# Patient Record
Sex: FEMALE | Race: WHITE | NOT HISPANIC OR LATINO | URBAN - METROPOLITAN AREA
[De-identification: names, ages, dates, MRNs, and addresses within clinical notes are randomized per-mention and may not be internally consistent; named-entity substitution may affect disease eponyms.]

---

## 2019-07-09 PROBLEM — Z00.00 ENCOUNTER FOR PREVENTIVE HEALTH EXAMINATION: Status: ACTIVE | Noted: 2019-07-09

## 2019-07-11 ENCOUNTER — OUTPATIENT (OUTPATIENT)
Dept: OUTPATIENT SERVICES | Facility: HOSPITAL | Age: 69
LOS: 1 days | End: 2019-07-11
Payer: MEDICARE

## 2019-07-11 ENCOUNTER — APPOINTMENT (OUTPATIENT)
Dept: MAMMOGRAPHY | Facility: IMAGING CENTER | Age: 69
End: 2019-07-11
Payer: MEDICARE

## 2019-07-11 DIAGNOSIS — Z00.8 ENCOUNTER FOR OTHER GENERAL EXAMINATION: ICD-10-CM

## 2019-07-11 PROCEDURE — 77063 BREAST TOMOSYNTHESIS BI: CPT

## 2019-07-11 PROCEDURE — 77067 SCR MAMMO BI INCL CAD: CPT | Mod: 26

## 2019-07-11 PROCEDURE — 77067 SCR MAMMO BI INCL CAD: CPT

## 2019-07-11 PROCEDURE — 77063 BREAST TOMOSYNTHESIS BI: CPT | Mod: 26

## 2019-07-18 ENCOUNTER — APPOINTMENT (OUTPATIENT)
Dept: ULTRASOUND IMAGING | Facility: IMAGING CENTER | Age: 69
End: 2019-07-18
Payer: MEDICARE

## 2019-07-18 ENCOUNTER — OUTPATIENT (OUTPATIENT)
Dept: OUTPATIENT SERVICES | Facility: HOSPITAL | Age: 69
LOS: 1 days | End: 2019-07-18
Payer: MEDICARE

## 2019-07-18 ENCOUNTER — APPOINTMENT (OUTPATIENT)
Dept: MAMMOGRAPHY | Facility: IMAGING CENTER | Age: 69
End: 2019-07-18
Payer: MEDICARE

## 2019-07-18 DIAGNOSIS — Z00.8 ENCOUNTER FOR OTHER GENERAL EXAMINATION: ICD-10-CM

## 2019-07-18 PROCEDURE — 77065 DX MAMMO INCL CAD UNI: CPT

## 2019-07-18 PROCEDURE — 77065 DX MAMMO INCL CAD UNI: CPT | Mod: 26,RT

## 2019-07-18 PROCEDURE — G0279: CPT

## 2019-07-18 PROCEDURE — 76642 ULTRASOUND BREAST LIMITED: CPT | Mod: 26,RT

## 2019-07-18 PROCEDURE — 76642 ULTRASOUND BREAST LIMITED: CPT

## 2019-07-18 PROCEDURE — G0279: CPT | Mod: 26

## 2020-01-31 ENCOUNTER — APPOINTMENT (OUTPATIENT)
Dept: ULTRASOUND IMAGING | Facility: IMAGING CENTER | Age: 70
End: 2020-01-31
Payer: MEDICARE

## 2020-01-31 ENCOUNTER — OUTPATIENT (OUTPATIENT)
Dept: OUTPATIENT SERVICES | Facility: HOSPITAL | Age: 70
LOS: 1 days | End: 2020-01-31
Payer: MEDICARE

## 2020-01-31 ENCOUNTER — APPOINTMENT (OUTPATIENT)
Dept: MAMMOGRAPHY | Facility: IMAGING CENTER | Age: 70
End: 2020-01-31
Payer: MEDICARE

## 2020-01-31 DIAGNOSIS — Z00.8 ENCOUNTER FOR OTHER GENERAL EXAMINATION: ICD-10-CM

## 2020-01-31 PROCEDURE — 76642 ULTRASOUND BREAST LIMITED: CPT | Mod: 26,RT

## 2020-01-31 PROCEDURE — 76642 ULTRASOUND BREAST LIMITED: CPT

## 2020-02-06 ENCOUNTER — RESULT REVIEW (OUTPATIENT)
Age: 70
End: 2020-02-06

## 2020-02-06 ENCOUNTER — OUTPATIENT (OUTPATIENT)
Dept: OUTPATIENT SERVICES | Facility: HOSPITAL | Age: 70
LOS: 1 days | End: 2020-02-06
Payer: MEDICARE

## 2020-02-06 ENCOUNTER — APPOINTMENT (OUTPATIENT)
Dept: ULTRASOUND IMAGING | Facility: IMAGING CENTER | Age: 70
End: 2020-02-06
Payer: MEDICARE

## 2020-02-06 DIAGNOSIS — Z00.8 ENCOUNTER FOR OTHER GENERAL EXAMINATION: ICD-10-CM

## 2020-02-06 PROCEDURE — 19083 BX BREAST 1ST LESION US IMAG: CPT | Mod: RT

## 2020-02-06 PROCEDURE — 77065 DX MAMMO INCL CAD UNI: CPT | Mod: 26,RT

## 2020-02-06 PROCEDURE — 19083 BX BREAST 1ST LESION US IMAG: CPT

## 2020-02-06 PROCEDURE — 77065 DX MAMMO INCL CAD UNI: CPT

## 2020-02-06 PROCEDURE — 88305 TISSUE EXAM BY PATHOLOGIST: CPT | Mod: 26

## 2020-02-06 PROCEDURE — 88305 TISSUE EXAM BY PATHOLOGIST: CPT

## 2020-02-06 PROCEDURE — A4648: CPT

## 2020-02-08 LAB — SURGICAL PATHOLOGY STUDY: SIGNIFICANT CHANGE UP

## 2021-05-21 ENCOUNTER — APPOINTMENT (OUTPATIENT)
Dept: UROGYNECOLOGY | Facility: CLINIC | Age: 71
End: 2021-05-21
Payer: MEDICARE

## 2021-05-21 VITALS
WEIGHT: 129 LBS | OXYGEN SATURATION: 98 % | TEMPERATURE: 96.9 F | HEIGHT: 60 IN | BODY MASS INDEX: 25.32 KG/M2 | SYSTOLIC BLOOD PRESSURE: 122 MMHG | DIASTOLIC BLOOD PRESSURE: 73 MMHG | HEART RATE: 72 BPM

## 2021-05-21 DIAGNOSIS — I10 ESSENTIAL (PRIMARY) HYPERTENSION: ICD-10-CM

## 2021-05-21 DIAGNOSIS — N32.89 OTHER SPECIFIED DISORDERS OF BLADDER: ICD-10-CM

## 2021-05-21 DIAGNOSIS — N81.3 COMPLETE UTEROVAGINAL PROLAPSE: ICD-10-CM

## 2021-05-21 DIAGNOSIS — E11.9 TYPE 2 DIABETES MELLITUS W/OUT COMPLICATIONS: ICD-10-CM

## 2021-05-21 DIAGNOSIS — R39.15 URGENCY OF URINATION: ICD-10-CM

## 2021-05-21 DIAGNOSIS — R39.9 UNSPECIFIED SYMPTOMS AND SIGNS INVOLVING THE GENITOURINARY SYSTEM: ICD-10-CM

## 2021-05-21 DIAGNOSIS — N39.46 MIXED INCONTINENCE: ICD-10-CM

## 2021-05-21 DIAGNOSIS — E78.5 HYPERLIPIDEMIA, UNSPECIFIED: ICD-10-CM

## 2021-05-21 LAB
BILIRUB UR QL STRIP: NORMAL
CLARITY UR: NORMAL
COLLECTION METHOD: NORMAL
GLUCOSE UR-MCNC: NORMAL
HCG UR QL: 0.2 EU/DL
HGB UR QL STRIP.AUTO: NORMAL
KETONES UR-MCNC: NORMAL
LEUKOCYTE ESTERASE UR QL STRIP: NORMAL
NITRITE UR QL STRIP: POSITIVE
PH UR STRIP: 5
PROT UR STRIP-MCNC: 100
SP GR UR STRIP: 1.02

## 2021-05-21 PROCEDURE — 51701 INSERT BLADDER CATHETER: CPT

## 2021-05-21 PROCEDURE — 99072 ADDL SUPL MATRL&STAF TM PHE: CPT

## 2021-05-21 PROCEDURE — 99204 OFFICE O/P NEW MOD 45 MIN: CPT | Mod: 25

## 2021-05-21 RX ORDER — SULFAMETHOXAZOLE AND TRIMETHOPRIM 800; 160 MG/1; MG/1
800-160 TABLET ORAL TWICE DAILY
Qty: 6 | Refills: 0 | Status: ACTIVE | COMMUNITY
Start: 2021-05-21 | End: 1900-01-01

## 2021-05-21 NOTE — OB HISTORY
[Total Preg ___] : : [unfilled] [Full Term ___] : [unfilled] (full-term) [Premature ___] : [unfilled] (premature) [ ___] : [unfilled]  section delivery(s) [Definite ___ (Date)] : the last menstrual period was [unfilled]

## 2021-05-23 NOTE — HISTORY OF PRESENT ILLNESS
[x3+] : nocturia three or more  times a night [Urinary Frequency] : no [Uses ___ pads per day] : uses [unfilled] pad(s) per day [Constipation Obstructed Defecation] : no [Stool Visible Blood] : no [Incomplete Emptying Of Stool] : no [] : no [Sexual Dysfunction, NOS] : no [FreeTextEntry5] : worse over the past few months [de-identified] : 2-3 [de-identified] : almost w/ all coughing/sneezing [de-identified] : 1 [de-identified] : most days [de-identified] : 1 [de-identified] : q2h [de-identified] : must splint to completely empty [de-identified] : q2h [de-identified] : regularly [de-identified] : 1 [de-identified] : intermittent bladder spasm at least daily [de-identified] : 4-5 [de-identified] : w/ prolonged [FreeTextEntry1] : PMH: DM, HTN, HLD \par PSH: c/s x4\par +sexually active\par \par Fluid intake: 1-2 cups/coffee, diet soda 1/2 liter/day, water 2 cups/day\par ROS as per questionnaire.\par

## 2021-05-23 NOTE — DISCUSSION/SUMMARY
[FreeTextEntry1] : I reviewed the above findings with the patient with visual illustrations.  Treatment options were discussed and included doing nothing, Kegel exercises and behavioral modification, a pessary, or surgical correction.  Surgically we discussed the abdominal v.s the vaginal routes.  Abdominally we discussed a hysterectomy and a sacral colpopexy laparoscopically and robotically.  Vaginally we discussed a vaginal hysterectomy, utereosacral suspension, and anterior/posterior repair.  Surgically we discussed hysteropexy as well as the use of biologics.  She is interested in surgical correction and I have asked her to return for urodynamic testing to further evaluate her urinary complaints.  I've also asked her to go for a pelvic ultrasound.  We will discuss further treatment options after her evaluation is completed.  Patient information on pelvic prolapse, stress incontinence, overactive bladder.  All questions were answered.\par \par \par ADDENDUM: Patient Udip reviewed. Discussed result with patient. Patient denies dysuria, fevers, chills, but discussed that patient bladder spasms can be a sx of UTI. Discussed with patient option of waiting for UCx result vs treatment now. Patient opted for Rx now. Discussed would follow up on UCx if she needs to switch abx. No allergies. Will send treatment with Bactrim x 3 days. \par

## 2021-05-23 NOTE — PHYSICAL EXAM
[Chaperone Present] : A chaperone was present in the examining room during all aspects of the physical examination [No Acute Distress] : in no acute distress [Well developed] : well developed [Well Nourished] : ~L well nourished [Oriented x3] : oriented to person, place, and time [Respirations regular] : ~T respiratory rate was regular [No Edema] : ~T edema was not present [Scar] : a scar was noted [Warm and Dry] : was warm and dry to touch [Normal Gait] : gait was normal [Mid-line] : in the mid-line [Post Void Residual ____ml] : post void residual was [unfilled] ml [Rectocele] : a rectocele [Cystocele] : a cystocele [Aa ____] : Aa [unfilled] [Ba ____] : Ba [unfilled] [C ____] : C [unfilled] [GH ____] : GH [unfilled] [PB ____] : PB [unfilled] [TVL ____] : TVL  [unfilled] [Ap ____] : Ap [unfilled] [Bp ____] : Bp [unfilled] [D ____] : D [unfilled] [] : III [Normal Appearance] : general appearance was normal [Atrophy] : atrophy [Uterine Prolapse] : uterine prolapse [Uterine Adnexae] : were not tender and not enlarged [Normal] : normal [Normal rectal exam] : was normal [Inguinal LAD] : no adenopathy was noted in the inguinal lymph nodes [FreeTextEntry3] : = hypermobility

## 2021-05-24 LAB
APPEARANCE: ABNORMAL
BACTERIA: ABNORMAL
BILIRUBIN URINE: NEGATIVE
BLOOD URINE: NORMAL
COLOR: YELLOW
GLUCOSE QUALITATIVE U: NEGATIVE
HYALINE CASTS: 0 /LPF
KETONES URINE: NEGATIVE
LEUKOCYTE ESTERASE URINE: ABNORMAL
MICROSCOPIC-UA: NORMAL
NITRITE URINE: POSITIVE
PH URINE: 6
PROTEIN URINE: ABNORMAL
RED BLOOD CELLS URINE: 8 /HPF
SPECIFIC GRAVITY URINE: 1.01
SQUAMOUS EPITHELIAL CELLS: 0 /HPF
UROBILINOGEN URINE: NORMAL
WHITE BLOOD CELLS URINE: >720 /HPF

## 2021-05-25 RX ORDER — FUROSEMIDE 20 MG/1
20 TABLET ORAL
Qty: 90 | Refills: 0 | Status: ACTIVE | COMMUNITY
Start: 2020-12-22

## 2021-05-25 RX ORDER — GLIPIZIDE 5 MG/1
5 TABLET, FILM COATED, EXTENDED RELEASE ORAL
Qty: 90 | Refills: 0 | Status: ACTIVE | COMMUNITY
Start: 2020-12-22

## 2021-05-25 RX ORDER — PANTOPRAZOLE 40 MG/1
40 TABLET, DELAYED RELEASE ORAL
Qty: 90 | Refills: 0 | Status: ACTIVE | COMMUNITY
Start: 2020-12-22

## 2021-05-25 RX ORDER — LOSARTAN POTASSIUM 50 MG/1
50 TABLET, FILM COATED ORAL
Qty: 90 | Refills: 0 | Status: ACTIVE | COMMUNITY
Start: 2020-12-22

## 2021-05-25 RX ORDER — GEMFIBROZIL 600 MG/1
600 TABLET, FILM COATED ORAL
Qty: 180 | Refills: 0 | Status: ACTIVE | COMMUNITY
Start: 2020-12-22

## 2021-05-25 RX ORDER — INDOMETHACIN 50 MG/1
50 CAPSULE ORAL
Qty: 60 | Refills: 0 | Status: ACTIVE | COMMUNITY
Start: 2020-12-22

## 2021-05-25 RX ORDER — METFORMIN HYDROCHLORIDE 1000 MG/1
1000 TABLET, COATED ORAL
Qty: 180 | Refills: 0 | Status: ACTIVE | COMMUNITY
Start: 2020-12-22

## 2021-05-25 RX ORDER — LEVOTHYROXINE SODIUM 50 UG/1
50 TABLET ORAL
Qty: 90 | Refills: 0 | Status: ACTIVE | COMMUNITY
Start: 2020-12-22

## 2021-05-28 ENCOUNTER — NON-APPOINTMENT (OUTPATIENT)
Age: 71
End: 2021-05-28

## 2021-05-28 ENCOUNTER — OUTPATIENT (OUTPATIENT)
Dept: OUTPATIENT SERVICES | Facility: HOSPITAL | Age: 71
LOS: 1 days | End: 2021-05-28
Payer: MEDICARE

## 2021-05-28 ENCOUNTER — APPOINTMENT (OUTPATIENT)
Dept: ULTRASOUND IMAGING | Facility: CLINIC | Age: 71
End: 2021-05-28
Payer: MEDICARE

## 2021-05-28 DIAGNOSIS — N81.3 COMPLETE UTEROVAGINAL PROLAPSE: ICD-10-CM

## 2021-05-28 DIAGNOSIS — Z00.8 ENCOUNTER FOR OTHER GENERAL EXAMINATION: ICD-10-CM

## 2021-05-28 PROCEDURE — 76856 US EXAM PELVIC COMPLETE: CPT | Mod: 26

## 2021-05-28 PROCEDURE — 76856 US EXAM PELVIC COMPLETE: CPT

## 2021-06-01 ENCOUNTER — APPOINTMENT (OUTPATIENT)
Dept: UROGYNECOLOGY | Facility: CLINIC | Age: 71
End: 2021-06-01
Payer: MEDICARE

## 2021-06-01 ENCOUNTER — OUTPATIENT (OUTPATIENT)
Dept: OUTPATIENT SERVICES | Facility: HOSPITAL | Age: 71
LOS: 1 days | End: 2021-06-01
Payer: MEDICARE

## 2021-06-01 ENCOUNTER — RESULT CHARGE (OUTPATIENT)
Age: 71
End: 2021-06-01

## 2021-06-01 VITALS — SYSTOLIC BLOOD PRESSURE: 115 MMHG | HEART RATE: 71 BPM | DIASTOLIC BLOOD PRESSURE: 60 MMHG | TEMPERATURE: 97.2 F

## 2021-06-01 DIAGNOSIS — Z01.818 ENCOUNTER FOR OTHER PREPROCEDURAL EXAMINATION: ICD-10-CM

## 2021-06-01 LAB
BILIRUB UR QL STRIP: NORMAL
CLARITY UR: CLEAR
COLLECTION METHOD: NORMAL
GLUCOSE UR-MCNC: NORMAL
HCG UR QL: 0.2 EU/DL
HGB UR QL STRIP.AUTO: NORMAL
KETONES UR-MCNC: NORMAL
LEUKOCYTE ESTERASE UR QL STRIP: NORMAL
NITRITE UR QL STRIP: NORMAL
PH UR STRIP: 5.5
PROT UR STRIP-MCNC: 30
SP GR UR STRIP: 1.02

## 2021-06-01 PROCEDURE — 51797 INTRAABDOMINAL PRESSURE TEST: CPT

## 2021-06-01 PROCEDURE — 51784 ANAL/URINARY MUSCLE STUDY: CPT

## 2021-06-01 PROCEDURE — 51729 CYSTOMETROGRAM W/VP&UP: CPT | Mod: 26

## 2021-06-01 PROCEDURE — 99072 ADDL SUPL MATRL&STAF TM PHE: CPT

## 2021-06-01 PROCEDURE — 51797 INTRAABDOMINAL PRESSURE TEST: CPT | Mod: 26

## 2021-06-01 PROCEDURE — 81003 URINALYSIS AUTO W/O SCOPE: CPT | Mod: QW

## 2021-06-01 PROCEDURE — 51729 CYSTOMETROGRAM W/VP&UP: CPT

## 2021-06-01 PROCEDURE — 51784 ANAL/URINARY MUSCLE STUDY: CPT | Mod: 26

## 2021-06-07 ENCOUNTER — APPOINTMENT (OUTPATIENT)
Dept: UROGYNECOLOGY | Facility: CLINIC | Age: 71
End: 2021-06-07
Payer: MEDICARE

## 2021-06-07 VITALS — TEMPERATURE: 97.5 F

## 2021-06-07 DIAGNOSIS — N36.41 HYPERMOBILITY OF URETHRA: ICD-10-CM

## 2021-06-07 PROCEDURE — 99214 OFFICE O/P EST MOD 30 MIN: CPT

## 2021-06-07 PROCEDURE — 99072 ADDL SUPL MATRL&STAF TM PHE: CPT

## 2021-06-07 NOTE — HISTORY OF PRESENT ILLNESS
[FreeTextEntry1] : Patient returns today with her  for followup on her pelvic organ prolapse and urinary incontinence. Her chart was reviewed. Review of symptoms was unchanged from initial visit. On initial visit. She had a pop Q stage 3 pelvic organ prolapse with uterovaginal prolapse, cystocele and rectocele.  Urodynamics revealed stress urinary incontinence.  Pelvic ultrasound was wnl. I reviewed the above findings with the patient with visual illustrations. Treatment options for the prolapse were discussed and included doing nothing, Kegel exercises and behavioral modification, a pessary, or surgical correction.Surgically we discussed the abdominal vs the vaginal routes. Abdominally we discussed a hysterectomy and a sacral colpopexy   laparoscopically and robotically.  Vaginally we discussed a vaginal hysterectomy, uterosacral suspension, and anterior/posterior repair. Surgically we discussed hysteropexy as well as the use of biologics. Patient is interested in long term management and durability and wishes to proceed via the laparoscopic robotic route with a hysterectomy and sacral colpopexy.We discussed treatment options for Stress urinary incontinence, and she wishes to proceed with a mid urethral sling at the time of surgical correction.Patient is aware there is added risk to procedure due to hx of 4 prior c/s and likely longer operating time An increased risk of injury to bladder, as well as bowel.  I have asked her to go for medical clearance. Patient IUGA information Sacral colpopexy with hysterectomy written on top and mid urethral sling was given to her. We discussed the possibility of going home with a catheter.    All questions were answered.

## 2021-06-09 ENCOUNTER — APPOINTMENT (OUTPATIENT)
Dept: UROGYNECOLOGY | Facility: CLINIC | Age: 71
End: 2021-06-09
Payer: MEDICARE

## 2021-06-09 VITALS
HEART RATE: 70 BPM | SYSTOLIC BLOOD PRESSURE: 145 MMHG | TEMPERATURE: 97.1 F | DIASTOLIC BLOOD PRESSURE: 75 MMHG | OXYGEN SATURATION: 95 %

## 2021-06-09 PROCEDURE — 99214 OFFICE O/P EST MOD 30 MIN: CPT

## 2021-06-09 PROCEDURE — 99072 ADDL SUPL MATRL&STAF TM PHE: CPT

## 2021-06-09 NOTE — HISTORY OF PRESENT ILLNESS
[FreeTextEntry1] : Patient returns today for followup on her pelvic organ prolapse and urinary incontinence. Her chart was reviewed. Review of symptoms was unchanged from initial visit. On initial visit she had a popQ stage 3 pelvic organ prolapse with uterovaginal prolapse, cystocele and rectocele.  Urodynamics revealed stress urinary incontinence.  Pelvic ultrasound was wnl.Patient is aware there is added risk to procedure due to hx of 4 prior c/s and likely longer operating time An increased risk of injury to bladder, as well as bowel.  I have asked her to go for medical clearance. Patient IUGA information Sacral colpopexy with hysterectomy written on top and mid urethral sling was given to her. We discussed the possibility of going home with a catheter.    All questions were answered.

## 2021-06-09 NOTE — DISCUSSION/SUMMARY
[FreeTextEntry1] : I reviewed the above findings with the patient as well surgical and nonsurgical treatment options for the prolapse  and stress incontinence and she wishes to proceed with surgical correction via laparoscopic/robotic hysterectomy and sacral colpopexy for the prolapse and mid urethral sling for the stress incontinence. Risks and benefits of a supracervical hysterectomy versus total were discussed and she wishes to proceed with a supracervical hysterectomy. Risks and benefits of a BSO were discussed and she wishes to proceed with a bilateral salpingectomy if possible. We discussed the possibility of a laparotomy at the time of surgical correction. Patient is aware there is added risk to procedure due to hx of 4 prior c/s and likely longer operating time An increased risk of injury to bladder, as well as bowel. We discussed the placement of a suburethral sling. Risks and benefits of a TOT sling versus a Retropubic sling  vs mini-sling were discussed and included but not limited to bladder and bowel perforation, voiding dysfunction, and groin pain. She wishes to proceed with a retropubic sling.Risks and benefits of the procedure were discussed and included but not limited to bleeding, infection, failure, erosion, dyspareunia, damage to other organs,  including but not limited to bladder, bowel, and ureters, developing chronic pelvic pain, and urinary retention. We discussed the possibility of going home with a catheter. Hospital stay, postoperative restrictions, and anesthesia were discussed. All questions were answered and she wishes to proceed with surgical correction. He discussed possible oophorectomy, and anterior posterior repair. We discussed pelvic examination under anesthesia by learners. Consent was signed in the office. \par

## 2021-06-09 NOTE — PHYSICAL EXAM
[Chaperone Present] : A chaperone was present in the examining room during all aspects of the physical examination [No Acute Distress] : in no acute distress [Well developed] : well developed [Well Nourished] : ~L well nourished [Oriented x3] : oriented to person, place, and time [Scar] : a scar was noted [Mid-line] : in the mid-line [Soft, Nontender] : the abdomen was soft and nontender [Warm and Dry] : was warm and dry to touch [Vulvar Atrophy] : vulvar atrophy [Normal Appearance] : general appearance was normal [Rectocele] : a rectocele [Cystocele] : a cystocele [Uterine Prolapse] : uterine prolapse [Pap Obtained] : a Pap smear was performed [Normal] : normal [Uterine Adnexae] : were not tender and not enlarged [de-identified] : No change from initial exam

## 2021-06-11 ENCOUNTER — OUTPATIENT (OUTPATIENT)
Dept: OUTPATIENT SERVICES | Facility: HOSPITAL | Age: 71
LOS: 1 days | End: 2021-06-11
Payer: MEDICARE

## 2021-06-11 VITALS
HEART RATE: 82 BPM | OXYGEN SATURATION: 97 % | RESPIRATION RATE: 16 BRPM | SYSTOLIC BLOOD PRESSURE: 117 MMHG | TEMPERATURE: 99 F | HEIGHT: 60 IN | DIASTOLIC BLOOD PRESSURE: 66 MMHG | WEIGHT: 132.06 LBS

## 2021-06-11 DIAGNOSIS — N81.11 CYSTOCELE, MIDLINE: ICD-10-CM

## 2021-06-11 DIAGNOSIS — N81.3 COMPLETE UTEROVAGINAL PROLAPSE: ICD-10-CM

## 2021-06-11 DIAGNOSIS — E11.9 TYPE 2 DIABETES MELLITUS WITHOUT COMPLICATIONS: ICD-10-CM

## 2021-06-11 DIAGNOSIS — Z29.9 ENCOUNTER FOR PROPHYLACTIC MEASURES, UNSPECIFIED: ICD-10-CM

## 2021-06-11 DIAGNOSIS — N39.3 STRESS INCONTINENCE (FEMALE) (MALE): ICD-10-CM

## 2021-06-11 DIAGNOSIS — N81.6 RECTOCELE: ICD-10-CM

## 2021-06-11 DIAGNOSIS — I10 ESSENTIAL (PRIMARY) HYPERTENSION: ICD-10-CM

## 2021-06-11 DIAGNOSIS — Z98.891 HISTORY OF UTERINE SCAR FROM PREVIOUS SURGERY: Chronic | ICD-10-CM

## 2021-06-11 DIAGNOSIS — Z01.818 ENCOUNTER FOR OTHER PREPROCEDURAL EXAMINATION: ICD-10-CM

## 2021-06-11 LAB
ANION GAP SERPL CALC-SCNC: 14 MMOL/L — SIGNIFICANT CHANGE UP (ref 5–17)
BACTERIA UR CULT: NORMAL
BLD GP AB SCN SERPL QL: POSITIVE — SIGNIFICANT CHANGE UP
BUN SERPL-MCNC: 35 MG/DL — HIGH (ref 7–23)
CALCIUM SERPL-MCNC: 10.7 MG/DL — HIGH (ref 8.4–10.5)
CHLORIDE SERPL-SCNC: 105 MMOL/L — SIGNIFICANT CHANGE UP (ref 96–108)
CO2 SERPL-SCNC: 19 MMOL/L — LOW (ref 22–31)
CREAT SERPL-MCNC: 1.65 MG/DL — HIGH (ref 0.5–1.3)
GLUCOSE SERPL-MCNC: 77 MG/DL — SIGNIFICANT CHANGE UP (ref 70–99)
HCT VFR BLD CALC: 31.3 % — LOW (ref 34.5–45)
HGB BLD-MCNC: 10.3 G/DL — LOW (ref 11.5–15.5)
HPV HIGH+LOW RISK DNA PNL CVX: NOT DETECTED
MCHC RBC-ENTMCNC: 32.9 GM/DL — SIGNIFICANT CHANGE UP (ref 32–36)
MCHC RBC-ENTMCNC: 33 PG — SIGNIFICANT CHANGE UP (ref 27–34)
MCV RBC AUTO: 100.3 FL — HIGH (ref 80–100)
NRBC # BLD: 0 /100 WBCS — SIGNIFICANT CHANGE UP (ref 0–0)
PLATELET # BLD AUTO: 389 K/UL — SIGNIFICANT CHANGE UP (ref 150–400)
POTASSIUM SERPL-MCNC: 4.7 MMOL/L — SIGNIFICANT CHANGE UP (ref 3.5–5.3)
POTASSIUM SERPL-SCNC: 4.7 MMOL/L — SIGNIFICANT CHANGE UP (ref 3.5–5.3)
RBC # BLD: 3.12 M/UL — LOW (ref 3.8–5.2)
RBC # FLD: 13.7 % — SIGNIFICANT CHANGE UP (ref 10.3–14.5)
RH IG SCN BLD-IMP: POSITIVE — SIGNIFICANT CHANGE UP
SODIUM SERPL-SCNC: 138 MMOL/L — SIGNIFICANT CHANGE UP (ref 135–145)
WBC # BLD: 7.95 K/UL — SIGNIFICANT CHANGE UP (ref 3.8–10.5)
WBC # FLD AUTO: 7.95 K/UL — SIGNIFICANT CHANGE UP (ref 3.8–10.5)

## 2021-06-11 PROCEDURE — 86870 RBC ANTIBODY IDENTIFICATION: CPT

## 2021-06-11 PROCEDURE — 86905 BLOOD TYPING RBC ANTIGENS: CPT

## 2021-06-11 PROCEDURE — 86901 BLOOD TYPING SEROLOGIC RH(D): CPT

## 2021-06-11 PROCEDURE — G0463: CPT

## 2021-06-11 PROCEDURE — 85027 COMPLETE CBC AUTOMATED: CPT

## 2021-06-11 PROCEDURE — 86850 RBC ANTIBODY SCREEN: CPT

## 2021-06-11 PROCEDURE — 86880 COOMBS TEST DIRECT: CPT

## 2021-06-11 PROCEDURE — 86900 BLOOD TYPING SEROLOGIC ABO: CPT

## 2021-06-11 PROCEDURE — 80048 BASIC METABOLIC PNL TOTAL CA: CPT

## 2021-06-11 RX ORDER — CEFOTETAN DISODIUM 1 G
2 VIAL (EA) INJECTION ONCE
Refills: 0 | Status: DISCONTINUED | OUTPATIENT
Start: 2021-06-24 | End: 2021-06-25

## 2021-06-11 RX ORDER — CELECOXIB 200 MG/1
400 CAPSULE ORAL ONCE
Refills: 0 | Status: DISCONTINUED | OUTPATIENT
Start: 2021-06-24 | End: 2021-06-25

## 2021-06-11 NOTE — H&P PST ADULT - PULMONARY EMBOLUS
Report received at bedside in ED by RN, patient care assumed. Placed on ZOLL monitor and transferred to unit. Tele box on. Monitor room informed. POC updated on white board Patient states pain is 3/10. VS taken. Fall precautions in place with bed in lowest position, treaded sock slippers on, and call light within reach. Will continue assess and monitor cognitive function and any physiological changes.    no

## 2021-06-11 NOTE — H&P PST ADULT - NSANTHOSAYNRD_GEN_A_CORE
No. JEVON screening performed.  STOP BANG Legend: 0-2 = LOW Risk; 3-4 = INTERMEDIATE Risk; 5-8 = HIGH Risk

## 2021-06-11 NOTE — H&P PST ADULT - NSICDXPROBLEM_GEN_ALL_CORE_FT
PROBLEM DIAGNOSES  Problem: Cystocele and rectocele with complete uterovaginal prolapse  Assessment and Plan: Laparoscopic rootic supra cervical hysterectomy  laparoscopic robotic sacral colpopexy  Mid urethral sling, cystoscopy  Labs- CBC, BMP, Hb A1C, T&S  Pre op instructions discussed  PMD eval prior to OR    Problem: Benign hypertension  Assessment and Plan: continue with meds    Problem: Type 2 diabetes mellitus  Assessment and Plan: FS BS DOS    Problem: Need for prophylactic measure  Assessment and Plan: The Caprini score indicates this patient is at risk for a VTE event (score 3-5).  Most surgical patients in this group would benefit from pharmacologic prophylaxis.  The surgical team will determine the balance between VTE risk and bleeding risk

## 2021-06-11 NOTE — H&P PST ADULT - HISTORY OF PRESENT ILLNESS
71 yo F with h/o HTN, T2DM, hypothyroidism, HLD c/o uterine prolapse x 2 years . Pt c/o increasing urinary frequency, stress incontinence x  3 months. Pt had uro/Gyn evaluation-s/p pelvic sonogram/urodynamics-revealed complete uterovaginal prolapse-scheduled for Laparoscopic robotic supracervical hysterectomy, sacral colpopexy, midurethral sling, cystoscopy on 6/24/21.    Pt denies any recent travel, fever, chills or Covid related infections  **Received covid vaccine x 2 doses, card enclosed

## 2021-06-11 NOTE — H&P PST ADULT - NSICDXPASTMEDICALHX_GEN_ALL_CORE_FT
PAST MEDICAL HISTORY:  COVID-19 virus infection 3/2020 denies any hospital admission    HLD (hyperlipidemia)     HTN (hypertension)     Hypothyroidism     T2DM (type 2 diabetes mellitus)

## 2021-06-11 NOTE — H&P PST ADULT - ASSESSMENT
CAPRINI SCORE [CLOT updated 18]    AGE RELATED RISK FACTORS                                                       MOBILITY RELATED FACTORS  [ ] Age 41-60 years                                            (1 Point)                    [ ] Bed rest                                                        (1 Point)  [x ] Age: 61-74 years                                           (2 Points)                  [ ] Plaster cast                                                   (2 Points)  [ ] Age= 75 years                                              (3 Points)                    [ ] Bed bound for more than 72 hours                 (2 Points)    DISEASE RELATED RISK FACTORS                                               GENDER SPECIFIC FACTORS  [ ] Edema in the lower extremities                       (1 Point)              [ ] Pregnancy                                                     (1 Point)  [ ] Varicose veins                                               (1 Point)                     [ ] Post-partum < 6 weeks                                   (1 Point)             [x ] BMI > 25 Kg/m2                                            (1 Point)                     [ ] Hormonal therapy  or oral contraception          (1 Point)                 [ ] Sepsis (in the previous month)                        (1 Point)               [ ] History of pregnancy complications                 (1 point)  [ ] Pneumonia or serious lung disease                                               [ ] Unexplained or recurrent                     (1 Point)           (in the previous month)                               (1 Point)  [ ] Abnormal pulmonary function test                     (1 Point)                 SURGERY RELATED RISK FACTORS  [ ] Acute myocardial infarction                              (1 Point)               [ ]  Section                                             (1 Point)  [ ] Congestive heart failure (in the previous month)  (1 Point)      [ ] Minor surgery                                                  (1 Point)   [ ] Inflammatory bowel disease                             (1 Point)               [ ] Arthroscopic surgery                                        (2 Points)  [ ] Central venous access                                      (2 Points)                [x ] General surgery lasting more than 45 minutes (2 points)  [ ] Present or previous malignancy                     (2 Points)                [ ] Elective arthroplasty                                         (5 points)    [ ] Stroke (in the previous month)                          (5 Points)                                                                                                                                                           HEMATOLOGY RELATED FACTORS                                                 TRAUMA RELATED RISK FACTORS  [ ] Prior episodes of VTE                                     (3 Points)                [ ] Fracture of the hip, pelvis, or leg                       (5 Points)  [ ] Positive family history for VTE                         (3 Points)             [ ] Acute spinal cord injury (in the previous month)  (5 Points)  [ ] Prothrombin 71326 A                                     (3 Points)               [ ] Paralysis  (less than 1 month)                             (5 Points)  [ ] Factor V Leiden                                             (3 Points)                  [ ] Multiple Trauma within 1 month                        (5 Points)  [ ] Lupus anticoagulants                                     (3 Points)                                                           [ ] Anticardiolipin antibodies                               (3 Points)                                                       [ ] High homocysteine in the blood                      (3 Points)                                             [ ] Other congenital or acquired thrombophilia      (3 Points)                                                [ ] Heparin induced thrombocytopenia                  (3 Points)                                     Total Score [  4        ]

## 2021-06-12 LAB
A1C WITH ESTIMATED AVERAGE GLUCOSE RESULT: 5.5 % — SIGNIFICANT CHANGE UP (ref 4–5.6)
ESTIMATED AVERAGE GLUCOSE: 111 MG/DL — SIGNIFICANT CHANGE UP (ref 68–114)

## 2021-06-12 PROCEDURE — 86077 PHYS BLOOD BANK SERV XMATCH: CPT

## 2021-06-16 LAB — CYTOLOGY CVX/VAG DOC THIN PREP: ABNORMAL

## 2021-06-17 PROBLEM — E78.5 HYPERLIPIDEMIA, UNSPECIFIED: Chronic | Status: ACTIVE | Noted: 2021-06-11

## 2021-06-17 PROBLEM — E11.9 TYPE 2 DIABETES MELLITUS WITHOUT COMPLICATIONS: Chronic | Status: ACTIVE | Noted: 2021-06-11

## 2021-06-17 PROBLEM — U07.1 COVID-19: Chronic | Status: ACTIVE | Noted: 2021-06-11

## 2021-06-17 PROBLEM — I10 ESSENTIAL (PRIMARY) HYPERTENSION: Chronic | Status: ACTIVE | Noted: 2021-06-11

## 2021-06-17 PROBLEM — E03.9 HYPOTHYROIDISM, UNSPECIFIED: Chronic | Status: ACTIVE | Noted: 2021-06-11

## 2021-06-23 ENCOUNTER — NON-APPOINTMENT (OUTPATIENT)
Age: 71
End: 2021-06-23

## 2021-06-23 ENCOUNTER — TRANSCRIPTION ENCOUNTER (OUTPATIENT)
Age: 71
End: 2021-06-23

## 2021-06-24 ENCOUNTER — INPATIENT (INPATIENT)
Facility: HOSPITAL | Age: 71
LOS: 0 days | Discharge: HOME | DRG: 743 | End: 2021-06-25
Attending: UROLOGY | Admitting: UROLOGY
Payer: MEDICARE

## 2021-06-24 ENCOUNTER — RESULT REVIEW (OUTPATIENT)
Age: 71
End: 2021-06-24

## 2021-06-24 ENCOUNTER — APPOINTMENT (OUTPATIENT)
Dept: UROGYNECOLOGY | Facility: HOSPITAL | Age: 71
End: 2021-06-24
Payer: MEDICARE

## 2021-06-24 VITALS
HEIGHT: 60 IN | TEMPERATURE: 98 F | HEART RATE: 64 BPM | SYSTOLIC BLOOD PRESSURE: 112 MMHG | OXYGEN SATURATION: 99 % | WEIGHT: 132.06 LBS | DIASTOLIC BLOOD PRESSURE: 72 MMHG | RESPIRATION RATE: 16 BRPM

## 2021-06-24 DIAGNOSIS — N81.11 CYSTOCELE, MIDLINE: ICD-10-CM

## 2021-06-24 DIAGNOSIS — N39.3 STRESS INCONTINENCE (FEMALE) (MALE): ICD-10-CM

## 2021-06-24 DIAGNOSIS — Z98.891 HISTORY OF UTERINE SCAR FROM PREVIOUS SURGERY: Chronic | ICD-10-CM

## 2021-06-24 LAB
ANION GAP SERPL CALC-SCNC: 15 MMOL/L — SIGNIFICANT CHANGE UP (ref 5–17)
BLD GP AB SCN SERPL QL: POSITIVE — SIGNIFICANT CHANGE UP
BUN SERPL-MCNC: 37 MG/DL — HIGH (ref 7–23)
CALCIUM SERPL-MCNC: 10.4 MG/DL — SIGNIFICANT CHANGE UP (ref 8.4–10.5)
CHLORIDE SERPL-SCNC: 106 MMOL/L — SIGNIFICANT CHANGE UP (ref 96–108)
CO2 SERPL-SCNC: 19 MMOL/L — LOW (ref 22–31)
CREAT SERPL-MCNC: 1.46 MG/DL — HIGH (ref 0.5–1.3)
GLUCOSE BLDC GLUCOMTR-MCNC: 209 MG/DL — HIGH (ref 70–99)
GLUCOSE BLDC GLUCOMTR-MCNC: 234 MG/DL — HIGH (ref 70–99)
GLUCOSE BLDC GLUCOMTR-MCNC: 92 MG/DL — SIGNIFICANT CHANGE UP (ref 70–99)
GLUCOSE SERPL-MCNC: 82 MG/DL — SIGNIFICANT CHANGE UP (ref 70–99)
HCT VFR BLD CALC: 35.3 % — SIGNIFICANT CHANGE UP (ref 34.5–45)
HGB BLD-MCNC: 11.6 G/DL — SIGNIFICANT CHANGE UP (ref 11.5–15.5)
MCHC RBC-ENTMCNC: 32.7 PG — SIGNIFICANT CHANGE UP (ref 27–34)
MCHC RBC-ENTMCNC: 32.9 GM/DL — SIGNIFICANT CHANGE UP (ref 32–36)
MCV RBC AUTO: 99.4 FL — SIGNIFICANT CHANGE UP (ref 80–100)
NRBC # BLD: 0 /100 WBCS — SIGNIFICANT CHANGE UP (ref 0–0)
PLATELET # BLD AUTO: 397 K/UL — SIGNIFICANT CHANGE UP (ref 150–400)
POTASSIUM SERPL-MCNC: 4.7 MMOL/L — SIGNIFICANT CHANGE UP (ref 3.5–5.3)
POTASSIUM SERPL-SCNC: 4.7 MMOL/L — SIGNIFICANT CHANGE UP (ref 3.5–5.3)
RBC # BLD: 3.55 M/UL — LOW (ref 3.8–5.2)
RBC # FLD: 13.9 % — SIGNIFICANT CHANGE UP (ref 10.3–14.5)
RH IG SCN BLD-IMP: POSITIVE — SIGNIFICANT CHANGE UP
RH IG SCN BLD-IMP: POSITIVE — SIGNIFICANT CHANGE UP
SODIUM SERPL-SCNC: 140 MMOL/L — SIGNIFICANT CHANGE UP (ref 135–145)
WBC # BLD: 8.82 K/UL — SIGNIFICANT CHANGE UP (ref 3.8–10.5)
WBC # FLD AUTO: 8.82 K/UL — SIGNIFICANT CHANGE UP (ref 3.8–10.5)

## 2021-06-24 PROCEDURE — 88307 TISSUE EXAM BY PATHOLOGIST: CPT | Mod: 26

## 2021-06-24 PROCEDURE — 58542 LSH W/T/O UT 250 G OR LESS: CPT

## 2021-06-24 PROCEDURE — 57250 REPAIR RECTUM & VAGINA: CPT

## 2021-06-24 PROCEDURE — 57288 REPAIR BLADDER DEFECT: CPT

## 2021-06-24 PROCEDURE — 57425 LAPAROSCOPY SURG COLPOPEXY: CPT

## 2021-06-24 RX ORDER — CHLORHEXIDINE GLUCONATE 213 G/1000ML
1 SOLUTION TOPICAL ONCE
Refills: 0 | Status: DISCONTINUED | OUTPATIENT
Start: 2021-06-24 | End: 2021-06-24

## 2021-06-24 RX ORDER — METOPROLOL TARTRATE 50 MG
5 TABLET ORAL ONCE
Refills: 0 | Status: DISCONTINUED | OUTPATIENT
Start: 2021-06-24 | End: 2021-06-25

## 2021-06-24 RX ORDER — DEXTROSE 50 % IN WATER 50 %
12.5 SYRINGE (ML) INTRAVENOUS ONCE
Refills: 0 | Status: DISCONTINUED | OUTPATIENT
Start: 2021-06-24 | End: 2021-06-25

## 2021-06-24 RX ORDER — HYDROMORPHONE HYDROCHLORIDE 2 MG/ML
0.25 INJECTION INTRAMUSCULAR; INTRAVENOUS; SUBCUTANEOUS
Refills: 0 | Status: DISCONTINUED | OUTPATIENT
Start: 2021-06-24 | End: 2021-06-24

## 2021-06-24 RX ORDER — GLUCAGON INJECTION, SOLUTION 0.5 MG/.1ML
1 INJECTION, SOLUTION SUBCUTANEOUS ONCE
Refills: 0 | Status: DISCONTINUED | OUTPATIENT
Start: 2021-06-24 | End: 2021-06-25

## 2021-06-24 RX ORDER — ONDANSETRON 8 MG/1
4 TABLET, FILM COATED ORAL ONCE
Refills: 0 | Status: DISCONTINUED | OUTPATIENT
Start: 2021-06-24 | End: 2021-06-24

## 2021-06-24 RX ORDER — DEXTROSE 50 % IN WATER 50 %
25 SYRINGE (ML) INTRAVENOUS ONCE
Refills: 0 | Status: DISCONTINUED | OUTPATIENT
Start: 2021-06-24 | End: 2021-06-25

## 2021-06-24 RX ORDER — SODIUM CHLORIDE 9 MG/ML
1000 INJECTION, SOLUTION INTRAVENOUS
Refills: 0 | Status: DISCONTINUED | OUTPATIENT
Start: 2021-06-24 | End: 2021-06-25

## 2021-06-24 RX ORDER — LEVOTHYROXINE SODIUM 125 MCG
50 TABLET ORAL DAILY
Refills: 0 | Status: DISCONTINUED | OUTPATIENT
Start: 2021-06-24 | End: 2021-06-25

## 2021-06-24 RX ORDER — ACETAMINOPHEN 500 MG
975 TABLET ORAL EVERY 6 HOURS
Refills: 0 | Status: DISCONTINUED | OUTPATIENT
Start: 2021-06-24 | End: 2021-06-25

## 2021-06-24 RX ORDER — INSULIN LISPRO 100/ML
VIAL (ML) SUBCUTANEOUS AT BEDTIME
Refills: 0 | Status: DISCONTINUED | OUTPATIENT
Start: 2021-06-24 | End: 2021-06-25

## 2021-06-24 RX ORDER — IBUPROFEN 200 MG
600 TABLET ORAL EVERY 6 HOURS
Refills: 0 | Status: DISCONTINUED | OUTPATIENT
Start: 2021-06-24 | End: 2021-06-25

## 2021-06-24 RX ORDER — GABAPENTIN 400 MG/1
600 CAPSULE ORAL ONCE
Refills: 0 | Status: COMPLETED | OUTPATIENT
Start: 2021-06-24 | End: 2021-06-24

## 2021-06-24 RX ORDER — LIDOCAINE HCL 20 MG/ML
0.2 VIAL (ML) INJECTION ONCE
Refills: 0 | Status: DISCONTINUED | OUTPATIENT
Start: 2021-06-24 | End: 2021-06-24

## 2021-06-24 RX ORDER — DEXTROSE 50 % IN WATER 50 %
15 SYRINGE (ML) INTRAVENOUS ONCE
Refills: 0 | Status: DISCONTINUED | OUTPATIENT
Start: 2021-06-24 | End: 2021-06-25

## 2021-06-24 RX ORDER — ACETAMINOPHEN 500 MG
1000 TABLET ORAL ONCE
Refills: 0 | Status: COMPLETED | OUTPATIENT
Start: 2021-06-24 | End: 2021-06-24

## 2021-06-24 RX ORDER — INSULIN LISPRO 100/ML
VIAL (ML) SUBCUTANEOUS
Refills: 0 | Status: DISCONTINUED | OUTPATIENT
Start: 2021-06-24 | End: 2021-06-25

## 2021-06-24 RX ORDER — OXYCODONE HYDROCHLORIDE 5 MG/1
5 TABLET ORAL EVERY 4 HOURS
Refills: 0 | Status: DISCONTINUED | OUTPATIENT
Start: 2021-06-24 | End: 2021-06-25

## 2021-06-24 RX ORDER — SODIUM CHLORIDE 9 MG/ML
3 INJECTION INTRAMUSCULAR; INTRAVENOUS; SUBCUTANEOUS EVERY 8 HOURS
Refills: 0 | Status: DISCONTINUED | OUTPATIENT
Start: 2021-06-24 | End: 2021-06-24

## 2021-06-24 RX ADMIN — GABAPENTIN 600 MILLIGRAM(S): 400 CAPSULE ORAL at 11:01

## 2021-06-24 RX ADMIN — Medication 1000 MILLIGRAM(S): at 11:01

## 2021-06-24 RX ADMIN — SODIUM CHLORIDE 75 MILLILITER(S): 9 INJECTION, SOLUTION INTRAVENOUS at 20:40

## 2021-06-24 NOTE — BRIEF OPERATIVE NOTE - OPERATION/FINDINGS
Uterus with dense adhesions of lower uterine segment to bladder.  Fallopian tubes with adhesions to bilateral ovaries.  Right ovary grossly normal.  Left ovary slightly enlarged and cystic.  Adhesions of bowel to left adnexa and side wall.  Following suspension and sling cystoscopy performed with bladder intact no suture no mesh.  Jet from right UO sluggish, flourescein given with strong jet noted bilaterally after.

## 2021-06-24 NOTE — BRIEF OPERATIVE NOTE - NSICDXBRIEFPREOP_GEN_ALL_CORE_FT
PRE-OP DIAGNOSIS:  Cystocele and rectocele with incomplete uterovaginal prolapse 24-Jun-2021 17:35:41  Elizabeth Bo  Urinary, incontinence, stress female 24-Jun-2021 17:35:53  Elizabeth Bo

## 2021-06-24 NOTE — BRIEF OPERATIVE NOTE - NSICDXBRIEFPROCEDURE_GEN_ALL_CORE_FT
PROCEDURES:  Robot-assisted laparoscopic supracervical hysterectomy with sacrocolpopexy 24-Jun-2021 17:34:41  Elizabeth Bo  Bilateral salpingectomy 24-Jun-2021 17:34:54  Elizabeth Bo  Left oophorectomy 24-Jun-2021 17:35:02  Elizabeth Bo  Creation, midurethral sling, female 24-Jun-2021 17:35:21  Elizabeth Bo   PROCEDURES:  Robot-assisted laparoscopic supracervical hysterectomy with sacrocolpopexy 24-Jun-2021 17:34:41  Elizabeth Bo  Bilateral salpingectomy 24-Jun-2021 17:34:54  Elizabeth Bo  Left oophorectomy 24-Jun-2021 17:35:02  Elizabeth Bo  Creation, midurethral sling, female 24-Jun-2021 17:35:21  Elizabeth Bo  Posterior colporrhaphy (rectocele repair) 24-Jun-2021 18:02:32  Elizabeth Bo

## 2021-06-24 NOTE — CHART NOTE - NSCHARTNOTEFT_GEN_A_CORE
R2 OBGYN POST-OP CHECK    S: Patient seen and evaluated at bedside. Pt sleeping, easily arousable. Patient reports pain controlled with analgesia. Pt denies N/V, SOB, CP, palpitations, fever/chills. Tolerating clears. Not OOB yet. Liang in place, UOP adequate.     O:   T(C): 36.4 (06-24-21 @ 20:00), Max: 36.4 (06-24-21 @ 20:00)  HR: 94 (06-24-21 @ 20:30) (86 - 95)  BP: 133/61 (06-24-21 @ 20:30) (133/61 - 161/67)  RR: 16 (06-24-21 @ 20:30) (16 - 18)  SpO2: 95% (06-24-21 @ 20:30) (95% - 100%)  Wt(kg): --  I&O's Summary    24 Jun 2021 07:01  -  24 Jun 2021 21:05  --------------------------------------------------------  IN: 150 mL / OUT: 780 mL / NET: -630 mL        Gen: Resting comfortably in bed, NAD  CV: S1S2, RRR  Lungs: CTA B/L  Abd: soft, appropriately tender, occasional BS x 4 quadrants.    Inc: Clean/dry/intact port sites x5 with dermabond  Ext: SCD's in place and functional, non-tender b/l, no edema        A/P: 70y Female s/p RA LINDSAY, SCC< MUS, posterior repair, cysto. Patient is recovering well post-op. Urine output adequate, 225 for last hour.     Neuro: PO Analgesia PRN  CV: Hemodynamically stable.  Monitor VS. CBC in AM.   Pulm: Saturating well on room air.  Encourage OOB and incentive spirometer use.   GI: Advance to regular diet as tolerated. Anti-emetics PRN.  : Liang to gravity. TOV in AM. UOP adequate  FEN: Electrolytes: LR@125cc/hr. BMP in AM.   Heme: DVT ppx w/ SCD's while in bed. Early ambulation, initially with assistance then as tolerated.  Continue HSQ   ID: Afebrile  Endo: ISS  Dispo: Discharge from PACU when criteria met.     Sherron, PGY2   d/w Dr. Bo

## 2021-06-24 NOTE — PRE-ANESTHESIA EVALUATION ADULT - NSANTHADDINFOFT_GEN_ALL_CORE
Medical clearance on chart. Patient noted to have Creatinine increased to 1.6 in PST. GYN staff contacted PMD for baseline reference which was 1.2. PMD agreed with repeating Creatinine DOS and proceeding if repeat < 1.5. Repeat =1.46 so will proceed with procedure. PMD to follow up with patient post op. Dr. Friedman agrees with plan.

## 2021-06-24 NOTE — BRIEF OPERATIVE NOTE - NSICDXBRIEFPOSTOP_GEN_ALL_CORE_FT
POST-OP DIAGNOSIS:  Urinary, incontinence, stress female 24-Jun-2021 17:36:18  Elizabeth Bo  Cystocele and rectocele with incomplete uterovaginal prolapse 24-Jun-2021 17:36:09  Elizabeth Bo

## 2021-06-25 ENCOUNTER — TRANSCRIPTION ENCOUNTER (OUTPATIENT)
Age: 71
End: 2021-06-25

## 2021-06-25 VITALS
DIASTOLIC BLOOD PRESSURE: 42 MMHG | OXYGEN SATURATION: 97 % | SYSTOLIC BLOOD PRESSURE: 104 MMHG | RESPIRATION RATE: 16 BRPM | HEART RATE: 87 BPM | TEMPERATURE: 98 F

## 2021-06-25 LAB
ANION GAP SERPL CALC-SCNC: 17 MMOL/L — SIGNIFICANT CHANGE UP (ref 5–17)
BUN SERPL-MCNC: 36 MG/DL — HIGH (ref 7–23)
CALCIUM SERPL-MCNC: 8.7 MG/DL — SIGNIFICANT CHANGE UP (ref 8.4–10.5)
CHLORIDE SERPL-SCNC: 102 MMOL/L — SIGNIFICANT CHANGE UP (ref 96–108)
CO2 SERPL-SCNC: 19 MMOL/L — LOW (ref 22–31)
CREAT SERPL-MCNC: 1.45 MG/DL — HIGH (ref 0.5–1.3)
GLUCOSE BLDC GLUCOMTR-MCNC: 172 MG/DL — HIGH (ref 70–99)
GLUCOSE BLDC GLUCOMTR-MCNC: 176 MG/DL — HIGH (ref 70–99)
GLUCOSE SERPL-MCNC: 157 MG/DL — HIGH (ref 70–99)
HCT VFR BLD CALC: 28.7 % — LOW (ref 34.5–45)
HCT VFR BLD CALC: 29.7 % — LOW (ref 34.5–45)
HGB BLD-MCNC: 9.4 G/DL — LOW (ref 11.5–15.5)
HGB BLD-MCNC: 9.8 G/DL — LOW (ref 11.5–15.5)
MCHC RBC-ENTMCNC: 32.8 GM/DL — SIGNIFICANT CHANGE UP (ref 32–36)
MCHC RBC-ENTMCNC: 33 PG — SIGNIFICANT CHANGE UP (ref 27–34)
MCV RBC AUTO: 100.7 FL — HIGH (ref 80–100)
NRBC # BLD: 0 /100 WBCS — SIGNIFICANT CHANGE UP (ref 0–0)
PLATELET # BLD AUTO: 376 K/UL — SIGNIFICANT CHANGE UP (ref 150–400)
POTASSIUM SERPL-MCNC: 4.2 MMOL/L — SIGNIFICANT CHANGE UP (ref 3.5–5.3)
POTASSIUM SERPL-SCNC: 4.2 MMOL/L — SIGNIFICANT CHANGE UP (ref 3.5–5.3)
RBC # BLD: 2.85 M/UL — LOW (ref 3.8–5.2)
RBC # FLD: 14.2 % — SIGNIFICANT CHANGE UP (ref 10.3–14.5)
SODIUM SERPL-SCNC: 138 MMOL/L — SIGNIFICANT CHANGE UP (ref 135–145)
WBC # BLD: 15.41 K/UL — HIGH (ref 3.8–10.5)
WBC # FLD AUTO: 15.41 K/UL — HIGH (ref 3.8–10.5)

## 2021-06-25 PROCEDURE — 85027 COMPLETE CBC AUTOMATED: CPT

## 2021-06-25 PROCEDURE — 88307 TISSUE EXAM BY PATHOLOGIST: CPT

## 2021-06-25 PROCEDURE — 85018 HEMOGLOBIN: CPT

## 2021-06-25 PROCEDURE — 80048 BASIC METABOLIC PNL TOTAL CA: CPT

## 2021-06-25 PROCEDURE — 86922 COMPATIBILITY TEST ANTIGLOB: CPT

## 2021-06-25 PROCEDURE — C1781: CPT

## 2021-06-25 PROCEDURE — C9399: CPT

## 2021-06-25 PROCEDURE — C1771: CPT

## 2021-06-25 PROCEDURE — S2900: CPT

## 2021-06-25 PROCEDURE — 86850 RBC ANTIBODY SCREEN: CPT

## 2021-06-25 PROCEDURE — 82962 GLUCOSE BLOOD TEST: CPT

## 2021-06-25 PROCEDURE — 85014 HEMATOCRIT: CPT

## 2021-06-25 PROCEDURE — 86900 BLOOD TYPING SEROLOGIC ABO: CPT

## 2021-06-25 PROCEDURE — 86901 BLOOD TYPING SEROLOGIC RH(D): CPT

## 2021-06-25 RX ORDER — OXYCODONE HYDROCHLORIDE 5 MG/1
1 TABLET ORAL
Qty: 10 | Refills: 0
Start: 2021-06-25

## 2021-06-25 RX ORDER — LOSARTAN POTASSIUM 100 MG/1
1 TABLET, FILM COATED ORAL
Qty: 0 | Refills: 0 | DISCHARGE

## 2021-06-25 RX ORDER — CHOLECALCIFEROL (VITAMIN D3) 125 MCG
1 CAPSULE ORAL
Qty: 0 | Refills: 0 | DISCHARGE

## 2021-06-25 RX ORDER — IBUPROFEN 200 MG
1 TABLET ORAL
Qty: 0 | Refills: 0 | DISCHARGE
Start: 2021-06-25

## 2021-06-25 RX ORDER — POLYETHYLENE GLYCOL 3350 17 G/17G
0 POWDER, FOR SOLUTION ORAL
Qty: 0 | Refills: 0 | DISCHARGE

## 2021-06-25 RX ORDER — METFORMIN HYDROCHLORIDE 850 MG/1
1 TABLET ORAL
Qty: 0 | Refills: 0 | DISCHARGE

## 2021-06-25 RX ORDER — LEVOTHYROXINE SODIUM 125 MCG
1 TABLET ORAL
Qty: 0 | Refills: 0 | DISCHARGE

## 2021-06-25 RX ORDER — ASPIRIN/CALCIUM CARB/MAGNESIUM 324 MG
0 TABLET ORAL
Qty: 0 | Refills: 0 | DISCHARGE

## 2021-06-25 RX ORDER — GEMFIBROZIL 600 MG
1 TABLET ORAL
Qty: 0 | Refills: 0 | DISCHARGE

## 2021-06-25 RX ORDER — ACETAMINOPHEN 500 MG
3 TABLET ORAL
Qty: 0 | Refills: 0 | DISCHARGE
Start: 2021-06-25

## 2021-06-25 RX ADMIN — Medication 975 MILLIGRAM(S): at 02:37

## 2021-06-25 RX ADMIN — Medication 50 MICROGRAM(S): at 06:37

## 2021-06-25 RX ADMIN — Medication 2: at 08:15

## 2021-06-25 RX ADMIN — Medication 2: at 12:53

## 2021-06-25 NOTE — PROGRESS NOTE ADULT - SUBJECTIVE AND OBJECTIVE BOX
[facility] MESA Progress Note    POD#1   HD#2    Patient seen and examined at bedside.  No acute events overnight. No acute complaints.  Pain well controlled.  Patient is ambulating  Patient is passing flatus.    Liang is still in place.   Denies CP, SOB, N/V, fevers, and chills.    Vital Signs Last 24 Hours  T(C): 36.4 (06-25-21 @ 05:10), Max: 36.8 (06-24-21 @ 23:40)  HR: 88 (06-25-21 @ 05:10) (64 - 98)  BP: 109/64 (06-25-21 @ 05:10) (101/53 - 161/67)  RR: 17 (06-25-21 @ 05:10) (16 - 18)  SpO2: 97% (06-25-21 @ 05:10) (92% - 100%)    I&O's Summary    24 Jun 2021 07:01  -  25 Jun 2021 06:53  --------------------------------------------------------  IN: 1080 mL / OUT: 1330 mL / NET: -250 mL        Physical Exam:  General: NAD  CV: RR, S1, S2, no M/R/G  Lungs: CTA b/l, good air flow b/l   Abdomen: Soft, mildly-tender to palpation diffusely, mildy distended, normoactive bowel sounds  : no bleeding on pad  Ext: No pain or swelling     Labs:                        11.6   8.82  )-----------( 397      ( 24 Jun 2021 11:47 )             35.3   baso x      eos x      imm gran x      lymph x      mono x      poly x          MEDICATIONS  (STANDING):  acetaminophen   Tablet .. 975 milliGRAM(s) Oral every 6 hours  cefoTEtan  IVPB 2 Gram(s) IV Intermittent once  celecoxib 400 milliGRAM(s) Oral once  dextrose 40% Gel 15 Gram(s) Oral once  dextrose 5%. 1000 milliLiter(s) (50 mL/Hr) IV Continuous <Continuous>  dextrose 5%. 1000 milliLiter(s) (100 mL/Hr) IV Continuous <Continuous>  dextrose 50% Injectable 25 Gram(s) IV Push once  dextrose 50% Injectable 12.5 Gram(s) IV Push once  dextrose 50% Injectable 25 Gram(s) IV Push once  glucagon  Injectable 1 milliGRAM(s) IntraMuscular once  ibuprofen  Tablet. 600 milliGRAM(s) Oral every 6 hours  insulin lispro (ADMELOG) corrective regimen sliding scale   SubCutaneous at bedtime  insulin lispro (ADMELOG) corrective regimen sliding scale   SubCutaneous three times a day before meals  lactated ringers. 1000 milliLiter(s) (75 mL/Hr) IV Continuous <Continuous>  levothyroxine 50 MICROGram(s) Oral daily    MEDICATIONS  (PRN):  metoprolol tartrate Injectable 5 milliGRAM(s) IV Push once PRN BP>140/90  oxyCODONE    IR 5 milliGRAM(s) Oral every 4 hours PRN Severe Pain (7 - 10)

## 2021-06-25 NOTE — CHART NOTE - NSCHARTNOTEFT_GEN_A_CORE
TOV Note    Active Trial of Void performed.   300cc NS instilled into the bladder by gravity.  Liang D/C'd  Pt voided  400  cc   Liang catheter  to remain out.          Pt tolerated well    Latonya Kwong PA-C

## 2021-06-25 NOTE — DISCHARGE NOTE PROVIDER - NSDCMRMEDTOKEN_GEN_ALL_CORE_FT
acetaminophen 325 mg oral tablet: 3 tab(s) orally every 6 hours  ibuprofen 600 mg oral tablet: 1 tab(s) orally every 6 hours  oxyCODONE 5 mg oral tablet: 1 tab(s) orally every 6 hours, As Needed -Severe Pain (7 - 10) MDD:4

## 2021-06-25 NOTE — PROGRESS NOTE ADULT - ASSESSMENT
A/P: 70y POD#1 s/p RA Supracervical Hysterectomy, SCC, MUS, posterior repair and cystoscopy. EBL:100. Patient is stable and doing well.    Neuro: PO pain meds.   CV: Hemodynamically stable  - f/u AM CBC  Pulm: Saturating well on room air, encourage oob/amb  GI: Continue regular diet  : UOP adequate, d/c cortez   - f/u AM BMP  - TOV in AM  Heme: c/w  SCDs for DVT ppx  FEN: LR@75.   ID: Afebrile  Endo: No active issues   Dispo: Continue routine post-op care.    Jorge Shah PGY3

## 2021-06-25 NOTE — DISCHARGE NOTE PROVIDER - NSDCFUADDINST_GEN_ALL_CORE_FT
Regular diet as tolerated, regular activity as tolerated, no heavy lifting for first six weeks.  Take tylenol, motrin, and oxycodone as needed for pain control.  Do not drive while taking oxycodone.  Nothing per vagina (ie no tampons, douching, or intercourse) until cleared by your doctor.  Shower only, no baths.  Please make an appointment to see your doctor in 2 weeks.  Call your doctor or go to the ED if you have bleeding that does not stop, are unable to urinate, or have a fever >100.4.

## 2021-06-25 NOTE — DISCHARGE NOTE PROVIDER - HOSPITAL COURSE
Patient admitted for scheduled surgery, pt underwent Robotic Assisted Supracervical hysterectomy, sacrocolpopexy, mid urethral sling, posterior repair and cystoscopy.  Pt with uncomplicated surgery ( see op note for details).  On POD # 1 pt with uncomplicated postop course, pt tolerating regular diet, ambulating without difficulty, pain well controlled and passing flautus.  Patient passed active TOV.  Patient doing well and cleared for discharge home.  On the day of discharge the patient is afebrile and hemodynamically stable.  She is cleared for discharge with instructions for appropriate follow up.

## 2021-06-25 NOTE — DISCHARGE NOTE PROVIDER - CARE PROVIDER_API CALL
Liam Friedman (MD)  Female Pelvic MedReconst Surg; Obstetrics and Gynecology  [address]  [place] [address]  Phone: [phone]  Fax: [phone]  Follow Up Time:

## 2021-06-25 NOTE — DISCHARGE NOTE NURSING/CASE MANAGEMENT/SOCIAL WORK - PATIENT PORTAL LINK FT
You can access the FollowMyHealth Patient Portal offered by [facility] by registering at the following website: [email or web address]. By joining [email or web address]’s FollowMyHealth portal, you will also be able to view your health information using other applications (apps) compatible with our system.

## 2021-06-25 NOTE — PROGRESS NOTE ADULT - NSPROGADDITIONALINFOA_GEN_ALL_CORE
pt seen, agree with above.. H/H consistent with equilibration and hydration based on previous h/h results pt seen, agree with above.. H/H consistent with equilibration and hydration based on previous h/h results.  Will repeat in a few hours.

## 2021-06-25 NOTE — DISCHARGE NOTE PROVIDER - NSDCCPCAREPLAN_GEN_ALL_CORE_FT
PRINCIPAL DISCHARGE DIAGNOSIS  Diagnosis: Uterine prolapse  Assessment and Plan of Treatment:       SECONDARY DISCHARGE DIAGNOSES  Diagnosis: S/P laparoscopic supracervical hysterectomy  Assessment and Plan of Treatment:

## 2021-07-06 ENCOUNTER — APPOINTMENT (OUTPATIENT)
Dept: UROGYNECOLOGY | Facility: CLINIC | Age: 71
End: 2021-07-06
Payer: MEDICARE

## 2021-07-06 VITALS — SYSTOLIC BLOOD PRESSURE: 123 MMHG | TEMPERATURE: 96.8 F | HEART RATE: 80 BPM | DIASTOLIC BLOOD PRESSURE: 71 MMHG

## 2021-07-06 DIAGNOSIS — N81.3 COMPLETE UTEROVAGINAL PROLAPSE: ICD-10-CM

## 2021-07-06 DIAGNOSIS — N81.6 RECTOCELE: ICD-10-CM

## 2021-07-06 DIAGNOSIS — N81.11 CYSTOCELE, MIDLINE: ICD-10-CM

## 2021-07-06 DIAGNOSIS — N39.3 STRESS INCONTINENCE (FEMALE) (MALE): ICD-10-CM

## 2021-07-06 LAB
BILIRUB UR QL STRIP: NORMAL
CLARITY UR: NORMAL
COLLECTION METHOD: NORMAL
GLUCOSE UR-MCNC: NORMAL
HCG UR QL: 0.2 EU/DL
HGB UR QL STRIP.AUTO: NORMAL
KETONES UR-MCNC: NORMAL
LEUKOCYTE ESTERASE UR QL STRIP: NORMAL
NITRITE UR QL STRIP: NORMAL
PH UR STRIP: 5.5
PROT UR STRIP-MCNC: >= 300
SP GR UR STRIP: 1.02
SURGICAL PATHOLOGY STUDY: SIGNIFICANT CHANGE UP

## 2021-07-06 PROCEDURE — 99024 POSTOP FOLLOW-UP VISIT: CPT

## 2021-07-06 PROCEDURE — 81003 URINALYSIS AUTO W/O SCOPE: CPT | Mod: QW

## 2021-07-06 RX ORDER — SULFAMETHOXAZOLE AND TRIMETHOPRIM 800; 160 MG/1; MG/1
800-160 TABLET ORAL TWICE DAILY
Qty: 6 | Refills: 0 | Status: ACTIVE | COMMUNITY
Start: 2021-07-06 | End: 1900-01-01

## 2021-07-09 LAB
APPEARANCE: ABNORMAL
BACTERIA: ABNORMAL
BILIRUBIN URINE: NEGATIVE
BLOOD URINE: ABNORMAL
COLOR: YELLOW
GLUCOSE QUALITATIVE U: NEGATIVE
HYALINE CASTS: 0 /LPF
KETONES URINE: NEGATIVE
LEUKOCYTE ESTERASE URINE: ABNORMAL
MICROSCOPIC-UA: NORMAL
NITRITE URINE: POSITIVE
PH URINE: 6
PROTEIN URINE: ABNORMAL
RED BLOOD CELLS URINE: 6 /HPF
SPECIFIC GRAVITY URINE: 1.02
SQUAMOUS EPITHELIAL CELLS: 3 /HPF
URINE COMMENTS: NORMAL
UROBILINOGEN URINE: NORMAL
WHITE BLOOD CELLS URINE: 322 /HPF

## 2021-08-03 ENCOUNTER — APPOINTMENT (OUTPATIENT)
Dept: UROGYNECOLOGY | Facility: CLINIC | Age: 71
End: 2021-08-03
Payer: MEDICARE

## 2021-08-03 VITALS — TEMPERATURE: 96.2 F

## 2021-08-03 DIAGNOSIS — Z09 ENCOUNTER FOR FOLLOW-UP EXAMINATION AFTER COMPLETED TREATMENT FOR CONDITIONS OTHER THAN MALIGNANT NEOPLASM: ICD-10-CM

## 2021-08-03 PROCEDURE — 99024 POSTOP FOLLOW-UP VISIT: CPT

## 2023-05-27 ENCOUNTER — OUTPATIENT (OUTPATIENT)
Dept: OUTPATIENT SERVICES | Facility: HOSPITAL | Age: 73
LOS: 1 days | End: 2023-05-27
Payer: MEDICARE

## 2023-05-27 ENCOUNTER — APPOINTMENT (OUTPATIENT)
Dept: MAMMOGRAPHY | Facility: IMAGING CENTER | Age: 73
End: 2023-05-27
Payer: MEDICARE

## 2023-05-27 ENCOUNTER — APPOINTMENT (OUTPATIENT)
Dept: ULTRASOUND IMAGING | Facility: IMAGING CENTER | Age: 73
End: 2023-05-27
Payer: MEDICARE

## 2023-05-27 DIAGNOSIS — Z00.8 ENCOUNTER FOR OTHER GENERAL EXAMINATION: ICD-10-CM

## 2023-05-27 DIAGNOSIS — Z98.891 HISTORY OF UTERINE SCAR FROM PREVIOUS SURGERY: Chronic | ICD-10-CM

## 2023-05-27 PROCEDURE — 77063 BREAST TOMOSYNTHESIS BI: CPT | Mod: 26

## 2023-05-27 PROCEDURE — 77063 BREAST TOMOSYNTHESIS BI: CPT

## 2023-05-27 PROCEDURE — 76641 ULTRASOUND BREAST COMPLETE: CPT | Mod: 26,50

## 2023-05-27 PROCEDURE — 77067 SCR MAMMO BI INCL CAD: CPT | Mod: 26

## 2023-05-27 PROCEDURE — 77067 SCR MAMMO BI INCL CAD: CPT

## 2023-05-27 PROCEDURE — 76641 ULTRASOUND BREAST COMPLETE: CPT
